# Patient Record
Sex: MALE | URBAN - METROPOLITAN AREA
[De-identification: names, ages, dates, MRNs, and addresses within clinical notes are randomized per-mention and may not be internally consistent; named-entity substitution may affect disease eponyms.]

---

## 2020-03-13 ENCOUNTER — TELEPHONE (OUTPATIENT)
Dept: HEALTH INFORMATION MANAGEMENT | Facility: OTHER | Age: 25
End: 2020-03-13

## 2020-03-13 NOTE — TELEPHONE ENCOUNTER
1. Caller Name: Patient                        Call Back Number: mobile  [facility] PCP or Specialty Provider: No  - Patient does not have any PCP in [facility]        2.  Does patient have any active symptoms of respiratory illness (fever OR cough OR shortness of breath)? Yes, the patient reports the following respiratory symptoms: fever of 100.4°F (38°C) or greater, cough and joint pain, denies shortness of breath.     3.  Does patient have any comoribidities? None     4.  In the last 30 days, has the patient traveled outside of the country OR in a high risk area within the  OR have any known contact with someone who has or is suspected to have COVID-19?  No. Patient did travel to [place] 2 weeks ago via airplane and works at [employer], reports he is around a lot of people.     5. Disposition: Advised to schedule with their insurance's preferred virtual visit provider to limit potential exposure to others;   [facility]:   [facility] [phone]  or  [phone] . Educated patient on further signs and symptoms to  monitor for and recommended to stay home and manage symptoms with OTC measures such as tylenol, antiinflammatories, and cough medicines. Advised patient to call back in 3 days if not better, and to self-isolate for at least 48-72 hours. Patient will obtain a work note from [employer] and remain home from work.    Note routed to PCP: FYI only.